# Patient Record
Sex: FEMALE
[De-identification: names, ages, dates, MRNs, and addresses within clinical notes are randomized per-mention and may not be internally consistent; named-entity substitution may affect disease eponyms.]

---

## 2020-01-20 ENCOUNTER — HOSPITAL ENCOUNTER (EMERGENCY)
Dept: HOSPITAL 43 - DL.ED | Age: 85
LOS: 1 days | Discharge: NURSING, REHAB OR LONG-TERM CARE | End: 2020-01-21
Payer: MEDICARE

## 2020-01-20 DIAGNOSIS — Z79.899: ICD-10-CM

## 2020-01-20 DIAGNOSIS — K21.9: ICD-10-CM

## 2020-01-20 DIAGNOSIS — Z79.82: ICD-10-CM
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
## 2020-01-20 DIAGNOSIS — I10: ICD-10-CM

## 2020-01-20 DIAGNOSIS — S72.142A: Primary | ICD-10-CM

## 2020-01-20 DIAGNOSIS — E78.00: ICD-10-CM

## 2020-01-20 LAB
ANION GAP SERPL CALC-SCNC: 10 MMOL/L
CHLORIDE SERPL-SCNC: 103 MMOL/L (ref 101–111)
SODIUM SERPL-SCNC: 132 MMOL/L (ref 135–145)

## 2020-01-20 PROCEDURE — 96374 THER/PROPH/DIAG INJ IV PUSH: CPT

## 2020-01-20 PROCEDURE — 80053 COMPREHEN METABOLIC PANEL: CPT

## 2020-01-20 PROCEDURE — 85610 PROTHROMBIN TIME: CPT

## 2020-01-20 PROCEDURE — 36415 COLL VENOUS BLD VENIPUNCTURE: CPT

## 2020-01-20 PROCEDURE — 96376 TX/PRO/DX INJ SAME DRUG ADON: CPT

## 2020-01-20 PROCEDURE — 85025 COMPLETE CBC W/AUTO DIFF WBC: CPT

## 2020-01-20 PROCEDURE — 99285 EMERGENCY DEPT VISIT HI MDM: CPT

## 2020-01-20 PROCEDURE — 51702 INSERT TEMP BLADDER CATH: CPT

## 2020-01-20 PROCEDURE — 73502 X-RAY EXAM HIP UNI 2-3 VIEWS: CPT

## 2020-01-20 NOTE — EDM.PDOC
ED HPI GENERAL MEDICAL PROBLEM





- General


Chief Complaint: Lower Extremity Injury/Pain


Stated Complaint: AMBULANCE


Time Seen by Provider: 01/20/20 22:55


Source of Information: Reports: Patient, EMS, EMS Notes Reviewed, RN, RN Notes 

Reviewed


History Limitations: Reports: No Limitations





- History of Present Illness


INITIAL COMMENTS - FREE TEXT/NARRATIVE: 


patient presents to ER per [facility] with complaint of left hip pain. Patient states 

she was in her bedroom putting pajama pants on when she slipped and fell 

falling on the left hip. Patient denies hitting her head or being knocked out. 

Patient states history of glaucoma, hypertension, and stents in her heart in 

2000. Left leg is shortened and left foot is externally rotated, pulses present.


Onset: Today, Sudden


  ** Left Upper Leg


Pain Score (Numeric/FACES): 10





- Related Data


 Allergies











Allergy/AdvReac Type Severity Reaction Status Date / Time


 


No Known Allergies Allergy   Verified 01/20/20 22:36











Home Meds: 


 Home Meds





Aspirin [Ecotrin EC] 325 mg PO DAILY 01/21/20 [History]


Brimonidine Tartrate [Alphagan P 0.1% Ophth Soln] 1 drop EYELF DAILY 01/21/20 [

History]


Fluorometholone [Fluorometholone 0.1% Ophth Susp] 1 drop EYELF DAILY 01/21/20 [

History]


Isosorbide Mononitrate [Imdur] 120 mg PO DAILY 01/21/20 [History]


Latanoprost [Xalatan 0.005% Ophth Soln] 1 drop EYELF BEDTIME 01/21/20 [History]


Losartan Potassium [Cozaar] 100 mg PO DAILY 01/21/20 [History]


Omega-3/DHA/Epa/Fish Oil [Omega-3 Fish Oil 1,000 MG Sfgl] 1 cap PO DAILY 01/21/ 20 [History]


Omeprazole 20 mg PO ACBREAKFAST 01/21/20 [History]


amLODIPine [Norvasc] 5 mg PO DAILY 01/21/20 [History]


atorvaSTATin [Lipitor] 20 mg PO BEDTIME 01/21/20 [History]











Past Medical History


HEENT History: Reports: Impaired Vision


Cardiovascular History: Reports: High Cholesterol, Hypertension


Gastrointestinal History: Reports: GERD





Social & Family History





- Tobacco Use


Smoking Status *Q: Never Smoker


Second Hand Smoke Exposure: No





- Caffeine Use


Caffeine Use: Reports: None





- Recreational Drug Use


Recreational Drug Use: No





Review of Systems





- Review of Systems


Review Of Systems: Comprehensive ROS is negative, except as noted in HPI.





ED EXAM, GENERAL





- Physical Exam


Exam: See Below


Exam Limited By: No Limitations


General Appearance: Alert, WD/WN, Mild Distress


Eye Exam: Bilateral Eye: EOMI, Normal Inspection


Ears: Normal External Exam, Hearing Grossly Normal


Nose: Normal Inspection


Throat/Mouth: Normal Inspection, Normal Voice, No Airway Compromise


Head: Atraumatic, Normocephalic


Neck: Normal Inspection, Supple, Non-Tender, Full Range of Motion


Respiratory/Chest: No Respiratory Distress, Lungs Clear, Normal Breath Sounds, 

No Accessory Muscle Use, Chest Non-Tender


Cardiovascular: Normal Peripheral Pulses, Regular Rate, Rhythm, No Edema, No 

Gallop, No JVD, No Murmur, No Rub


Peripheral Pulses: 2+: Radial (L), Radial (R), Dorsalis Pedis (L), Dorsalis 

Pedis (R)


GI/Abdominal: Normal Bowel Sounds, Soft, Non-Tender


 (Female) Exam: Deferred


Rectal (Female) Exam: Deferred


Back Exam: Normal Inspection, Full Range of Motion, NT


Extremities: Leg Pain (left), Limited Range of Motion (left leg shortened and 

externally rotated)


Neurological: Alert, Oriented, CN II-XII Intact, Normal Cognition, Normal Gait, 

Normal Reflexes, No Motor/Sensory Deficits


Psychiatric: Normal Affect, Normal Mood


Skin Exam: Warm, Dry, Intact, Normal Color, No Rash


Lymphatic: No Adenopathy





Course





- Vital Signs


Last Recorded V/S: 


 Last Vital Signs











Temp  98.8 F   01/20/20 22:37


 


Pulse  48 L  01/20/20 22:37


 


Resp  16   01/20/20 22:37


 


BP  165/53 H  01/20/20 22:37


 


Pulse Ox  99   01/20/20 22:37














- Orders/Labs/Meds


Orders: 


 Active Orders 24 hr











 Category Date Time Status


 


 Insert Miller Catheter [Insert Urinary Catheter] [OM.PC] Care  01/21/20 01:00 

Ordered





 Q24H   


 


 Urinary Catheter Assessment [RC] ASDIRECTED Care  01/21/20 00:48 Active


 


 Femur Min 2V Lt [CR] Stat Exams  01/20/20 23:16 Ordered


 


 Hip Min 2V or 3V Lt [CR] Stat Exams  01/20/20 23:16 Taken











Labs: 


 Laboratory Tests











  01/20/20 01/20/20 01/20/20 Range/Units





  22:45 22:45 23:30 


 


WBC  6.5    (5.0-10.0)  10^3/uL


 


RBC  3.86 L    (4.2-5.4)  10^6/uL


 


Hgb  11.7 L    (12.0-16.0)  g/dL


 


Hct  34.2 L    (37.0-47.0)  %


 


MCV  88.6    ()  fL


 


MCH  30.3    (27.0-34.0)  pg


 


MCHC  34.2    (33.0-35.0)  g/dL


 


Plt Count  157    (150-450)  10^3/uL


 


Neut % (Auto)  71.4    (42.2-75.2)  %


 


Lymph % (Auto)  15.6 L    (20.5-50.1)  %


 


Mono % (Auto)  10.9 H    (2-8)  %


 


Eos % (Auto)  1.8    (1.0-3.0)  %


 


Baso % (Auto)  0.3    (0.0-1.0)  %


 


PT    9.6  (9.0-12.0)  SEC


 


INR    0.9  (0.9-1.2)  


 


Sodium   132 L   (135-145)  mmol/L


 


Potassium   4.0   (3.6-5.0)  mmol/L


 


Chloride   103   (101-111)  mmol/L


 


Carbon Dioxide   23.0   (21.0-31.0)  mmol/L


 


Anion Gap   10.0   


 


BUN   20 H   (7-18)  mg/dL


 


Creatinine   1.0   (0.6-1.3)  mg/dL


 


Est Cr Clr Drug Dosing   30.62   mL/min


 


Estimated GFR (MDRD)   53   


 


BUN/Creatinine Ratio   20.00   


 


Glucose   123 H   ()  mg/dL


 


Calcium   9.4   (8.4-10.2)  mg/dl


 


Total Bilirubin   0.6   (0.2-1.0)  mg/dL


 


AST   23   (10-42)  IU/L


 


ALT   16   (10-60)  IU/L


 


Alkaline Phosphatase   56   ()  IU/L


 


Total Protein   7.2   (6.7-8.2)  g/dl


 


Albumin   4.3   (3.2-5.5)  g/dl


 


Globulin   2.9   


 


Albumin/Globulin Ratio   1.48   











Meds: 


Medications














Discontinued Medications














Generic Name Dose Route Start Last Admin





  Trade Name Kei  PRN Reason Stop Dose Admin


 


Fentanyl  50 mcg  01/21/20 00:37  01/21/20 00:43





  Sublimaze  IVPUSH  01/21/20 00:38  50 mcg





  ONETIME ONE   Administration





     





     





     





     


 


Fentanyl  50 mcg  01/21/20 01:31  01/21/20 01:33





  Sublimaze  IVPUSH  01/21/20 01:32  50 mcg





  ONETIME ONE   Administration





     





     





     





     














- Radiology Interpretation


Free Text/Narrative:: 


Left hip/pelvis xray:


FINDINGS:


Bones/joints: The bones are demineralized which reduces radiographic 

sensitivity. There is an acute


intertrochanteric fracture of the proximal left femur. The distal fracture 

fragment is anteriorly displaced


by less than half the width of the shaft. The shaft of the femur is superiorly 

retracted such that the


greater trochanter lies at the level of the acetabular roof. The left femoral 

head maintains its normal


rounded shape and is appropriately seated within its acetabulum. The left pubic 

rami are intact. The


pubic symphysis is not widened.


Soft tissues: Unremarkable.


Vasculature: Moderate atherosclerotic calcifications are noted in the 

distribution of the left common


femoral and superficial femoral arteries.


IMPRESSION:


Acute intertrochanteric fracture of the left hip.


Thank you for allowing us to participate in the care of your patient.


Dictated and Authenticated by: Gladys Peters MD


01/21/2020 12:30 AM Central Time ([place] & [place])








See rad report








- Re-Assessments/Exams


Free Text/Narrative Re-Assessment/Exam: 





01/21/20 01:20


Discussed patient case with Dr. Thornton who agreed to accept the patient for 

transfer to [facility]. 








Departure





- Departure


Time of Disposition: 01:20


Disposition: DC/Tfer to Acute Hospital 02


Condition: Fair


Clinical Impression: 


 Intertrochanteric fracture, hip








- Discharge Information


*PRESCRIPTION DRUG MONITORING PROGRAM REVIEWED*: No


*COPY OF PRESCRIPTION DRUG MONITORING REPORT IN PATIENT VICKIE: No


Referrals: 


PCP,None [Primary Care Provider] - 


Forms[facility]  [facility]





Sepsis Event Note





- Evaluation


Sepsis Screening Result: No Definite Risk





- Focused Exam


Vital Signs: 


 Vital Signs











  Temp Pulse Resp BP Pulse Ox


 


 01/20/20 22:37  98.8 F  48 L  16  165/53 H  99











Date Exam was Performed: 01/21/20


Time Exam was Performed: 03:07





- My Orders


Last 24 Hours: 


My Active Orders





01/20/20 23:16


Femur Min 2V Lt [CR] Stat 


Hip Min 2V or 3V Lt [CR] Stat 





01/21/20 00:48


Urinary Catheter Assessment [RC] ASDIRECTED 





01/21/20 01:00


Insert Miller Catheter [Insert Urinary Catheter] [OM.PC] Q24H 














- Assessment/Plan


Last 24 Hours: 


My Active Orders





01/20/20 23:16


Femur Min 2V Lt [CR] Stat 


Hip Min 2V or 3V Lt [CR] Stat 





01/21/20 00:48


Urinary Catheter Assessment [RC] ASDIRECTED 





01/21/20 01:00


Insert Miller Catheter [Insert Urinary Catheter] [OM.PC] Q24H

## 2020-01-24 ENCOUNTER — HOSPITAL ENCOUNTER (INPATIENT)
Dept: HOSPITAL 43 - DL.MS | Age: 85
LOS: 6 days | DRG: 560 | End: 2020-01-30
Attending: INTERNAL MEDICINE | Admitting: HOSPITALIST
Payer: MEDICARE

## 2020-01-24 DIAGNOSIS — Z95.5: ICD-10-CM

## 2020-01-24 DIAGNOSIS — I35.1: ICD-10-CM

## 2020-01-24 DIAGNOSIS — Z79.899: ICD-10-CM

## 2020-01-24 DIAGNOSIS — R53.1: ICD-10-CM

## 2020-01-24 DIAGNOSIS — E78.5: ICD-10-CM

## 2020-01-24 DIAGNOSIS — Z95.0: ICD-10-CM
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
## 2020-01-24 DIAGNOSIS — I10: ICD-10-CM

## 2020-01-24 DIAGNOSIS — K59.09: ICD-10-CM

## 2020-01-24 DIAGNOSIS — Z96.651: ICD-10-CM

## 2020-01-24 DIAGNOSIS — D62: ICD-10-CM
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
## 2020-01-24 DIAGNOSIS — Z79.82: ICD-10-CM

## 2020-01-24 DIAGNOSIS — H92.01: ICD-10-CM
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
## 2020-01-24 DIAGNOSIS — H54.7: ICD-10-CM

## 2020-01-24 DIAGNOSIS — S02.5XXA: ICD-10-CM

## 2020-01-24 DIAGNOSIS — M19.90: ICD-10-CM

## 2020-01-24 DIAGNOSIS — K21.9: ICD-10-CM

## 2020-01-24 DIAGNOSIS — Z86.79: ICD-10-CM

## 2020-01-24 DIAGNOSIS — E78.00: ICD-10-CM

## 2020-01-24 DIAGNOSIS — F41.9: ICD-10-CM

## 2020-01-24 DIAGNOSIS — I46.9: ICD-10-CM

## 2020-01-24 DIAGNOSIS — I25.10: ICD-10-CM

## 2020-01-24 DIAGNOSIS — I49.01: ICD-10-CM

## 2020-01-24 DIAGNOSIS — Z28.82: ICD-10-CM

## 2020-01-24 DIAGNOSIS — Z98.42: ICD-10-CM

## 2020-01-24 DIAGNOSIS — S72.002D: Primary | ICD-10-CM

## 2020-01-24 DIAGNOSIS — H40.9: ICD-10-CM

## 2020-01-24 RX ADMIN — Medication SCH DROP: at 21:06

## 2020-01-24 NOTE — PCM.HP
H&P History of Present Illness





- General


Date of Service: 01/24/20


Admit Problem/Dx: 


 Admission Diagnosis/Problem





Admission Diagnosis/Problem      Weakness











- History of Present Illness


Initial Comments - Free Text/Narative: 





History of Present Illness:


Brittany Garcia a84 y.o.female with a medical history of CAD status post PCI, a 

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 

hyperlipidemia, hypertension, glaucoma, who was admitted following a fall with 

left hip fracture. Patient is status post gamma nailing.


Transferred to swing bed for physical and occupational therapy 








- Related Data


Allergies/Adverse Reactions: 


 Allergies











Allergy/AdvReac Type Severity Reaction Status Date / Time


 


No Known Allergies Allergy   Verified 01/24/20 09:04











Home Medications: 


 Home Meds





Aspirin [Ecotrin EC] 325 mg PO DAILY 01/21/20 [History]


Brimonidine Tartrate [Alphagan P 0.1% Ophth Soln] 1 drop EYELF BID 01/21/20 [

History]


Fluorometholone [Fluorometholone 0.1% Ophth Susp] 1 drop EYELF DAILY 01/21/20 [

History]


Isosorbide Mononitrate [Imdur] 120 mg PO DAILY 01/21/20 [History]


Latanoprost [Xalatan 0.005% Ophth Soln] 1 drop EYELF BEDTIME 01/21/20 [History]


Losartan Potassium [Cozaar] 100 mg PO DAILY 01/21/20 [History]


Omega-3/DHA/Epa/Fish Oil [Omega-3 Fish Oil 1,000 MG Sfgl] 1 cap PO DAILY 01/21/ 20 [History]


Omeprazole 20 mg PO ACBREAKFAST 01/21/20 [History]


amLODIPine [Norvasc] 5 mg PO DAILY 01/21/20 [History]


atorvaSTATin [Lipitor] 20 mg PO BEDTIME 01/21/20 [History]


Acetaminophen 1,000 mg PO Q8HR 01/24/20 [History]


LORazepam [Ativan] 0.5 mg PO TID PRN 01/24/20 [History]


Nitroglycerin [Nitrostat] 0.4 mg PO ASDIRECTED 01/24/20 [History]


Timolol Maleate [Timoptic 0.5% Ophth Soln] 1 drop EYEBOTH DAILY 01/24/20 [

History]











Past Medical History


HEENT History: Reports: Cataract, Glaucoma, Impaired Vision


Cardiovascular History: Reports: High Cholesterol, Hypertension, Pacemaker, 

Stents


Gastrointestinal History: Reports: Chronic Constipation, GERD, Hemorrhoids


OB/GYN History: Reports: Pregnancy


Musculoskeletal History: Reports: Arthritis


Psychiatric History: Reports: Anxiety





- Past Surgical History


HEENT Surgical History: Reports: Cataract Surgery


Other HEENT Surgeries/Procedures: Cataract Surgery in left eye and Glaucoma in 

left eye


Cardiovascular Surgical History: Reports: Pacer


GI Surgical History: Reports: None


Musculoskeletal Surgical History: Reports: Knee Replacement, Other (See Below)


Other Musculoskeletal Surgeries/Procedures:: Left femur fracture.  Right knee 

replacement





Social & Family History





- Family History


Family Medical History: Noncontributory





- Tobacco Use


Smoking Status *Q: Never Smoker


Second Hand Smoke Exposure: No





- Caffeine Use


Caffeine Use: Reports: Soda


Caffeine Use Comment: de-caff coffee





- Recreational Drug Use


Recreational Drug Use: No





H&P Review of Systems





- Review of Systems:


Review Of Systems: See Below


General: Reports: Weakness


HEENT: Reports: No Symptoms


Pulmonary: Reports: No Symptoms


Cardiovascular: Reports: No Symptoms


Gastrointestinal: Reports: No Symptoms


Genitourinary: Reports: No Symptoms


Musculoskeletal: Reports: Leg Pain


Skin: Reports: No Symptoms





Exam





- Exam


Exam: See Below





- Vital Signs


Weight: 58.786 kg





- Exam


General: Alert, Oriented, Cooperative


HEENT: PERRLA, Hearing Intact, Mucosa Moist & Pink, Nares Patent, Normal Nasal 

Septum, Posterior Pharynx Clear, Conjunctiva Clear, EOMI, EACs Clear, TMs Clear


Neck: Supple, Trachea Midline, 2


Lungs: Clear to Auscultation, Normal Respiratory Effort


Cardiovascular: Regular Rate, Regular Rhythm


GI/Abdominal Exam: Normal Bowel Sounds, Soft, Non-Tender, No Organomegaly, No 

Distention, No Abnormal Bruit, No Mass, Pelvis Stable


Extremities: No Pedal Edema


Skin: Warm, Dry, Intact


Problem List Initiated/Reviewed/Updated: Yes


Orders Last 24hrs: 


 Active Orders 24 hr











 Category Date Time Status


 


 Patient Status [ADT] Routine ADT  01/24/20 12:55 Active


 


 Height and Weight [RC] PER UNIT ROUTINE Care  01/24/20 12:58 Active


 


 Intake and Output [RC] ASDIRECTED Care  01/24/20 12:55 Active


 


 Oxygen Therapy [RC] PRN Care  01/24/20 12:55 Active


 


 Up ad Marlyn [RC] ASDIRECTED Care  01/24/20 12:55 Active


 


 VTE/DVT Education [RC] PER UNIT ROUTINE Care  01/24/20 12:55 Active


 


 Vital Signs [RC] PER UNIT ROUTINE Care  01/24/20 12:55 Active


 


 OT Evaluation and Treatment [CONS] Routine Cons  01/24/20 12:55 Active


 


 PT Evaluation and Treatment [CONS] Routine Cons  01/24/20 12:55 Active


 


 Regular Diet [DIET] Diet  01/24/20 Lunch Active


 


 Acetaminophen [Tylenol Extra Strength] Med  01/24/20 14:00 Active





 1,000 mg PO Q8HR   


 


 Acetaminophen [Tylenol] Med  01/24/20 12:55 Active





 650 mg PO Q4H PRN   


 


 Aspirin [Ecotrin] Med  01/25/20 09:00 Active





 325 mg PO DAILY   


 


 Brimonidine Tartrate [Alphagan P 0.1% Ophth Soln] Med  01/24/20 21:00 Active





 1 drop EYELF BID   


 


 Docusate Sodium [Colace] Med  01/24/20 12:55 Ordered





 100 mg PO BID PRN   


 


 Enoxaparin [Lovenox] Med  01/25/20 09:00 Active





 30 mg SUBCUT DAILY   


 


 Fluorometholone Med  01/25/20 09:00 Active





 1 drop EYELF DAILY   


 


 Isosorbide Mononitrate [Imdur] Med  01/25/20 06:00 Active





 120 mg PO ACBRK   


 


 LORazepam [Ativan] Med  01/24/20 13:10 Active





 0.5 mg PO TID PRN   


 


 Latanoprost [Xalatan 0.005% Ophth Soln] Med  01/24/20 21:00 Active





 0 ml EYELF BEDTIME   


 


 Losartan [Cozaar] Med  01/25/20 09:00 Active





 100 mg PO DAILY   


 


 Omega-3/DHA/Epa/Fish Oil [Omega-3 Fish Oil 1,000 MG Med  01/25/20 09:00 Ordered





 Sfgl]   





 1 cap PO DAILY   


 


 Omeprazole Med  01/25/20 11:00 Active





 20 mg PO ACLUNCH   


 


 Ondansetron [Zofran ODT] Med  01/24/20 12:55 Ordered





 4 mg PO Q6H PRN   


 


 Timolol Maleate [Timoptic 0.5% Ophth Soln] Med  01/25/20 09:00 Ordered





 DOSE ml EYEBOTH DAILY   


 


 amLODIPine [Norvasc] Med  01/24/20 13:45 Active





 5 mg PO DAILY   


 


 atorvaSTATin [Lipitor] Med  01/24/20 21:00 Active





 20 mg PO BEDTIME   


 


 Resuscitation Status Routine Resus Stat  01/24/20 12:55 Ordered








 Medication Orders





Acetaminophen (Tylenol)  650 mg PO Q4H PRN


   PRN Reason: Pain (Mild 1-3)/fever


Acetaminophen (Tylenol Extra Strength)  1,000 mg PO Q8HR [facility]


Amlodipine Besylate (Norvasc)  5 mg PO DAILY [facility]


Aspirin (Ecotrin)  325 mg PO DAILY [facility]


Atorvastatin Calcium (Lipitor)  20 mg PO BEDTIME [facility]


Docusate Sodium (Colace)  100 mg PO BID PRN


   PRN Reason: Constipation


Enoxaparin Sodium (Lovenox)  30 mg SUBCUT DAILY [facility]


Isosorbide Mononitrate (Imdur)  120 mg PO ACBRK [facility]


Latanoprost (Xalatan 0.005% Ophth Soln)  0 ml EYELF BEDTIME [facility]


Lorazepam (Ativan)  0.5 mg PO TID PRN


   PRN Reason: Anxiety


Losartan Potassium (Cozaar)  100 mg PO DAILY [facility]


Brimonidine Tartrate [Alphagan P 0.1% Ophth Soln]  **Pt Own Med**  1 drop EYELF 

BID [facility]


Fluorometholone 0.1% (Opht Susp)  1 drop EYELF DAILY [facility]


Non-Formulary Medication (Omega-3/Dha/Epa/Fish Oil [Omega-3 Fish Oil 1,000 Mg 

Sfgl])  1 cap PO DAILY [facility]


Omeprazole (Omeprazole)  20 mg PO ACLUNCH [facility]


Ondansetron HCl (Zofran Odt)  4 mg PO Q6H PRN


   PRN Reason: nausea, able to take PO


Timolol Maleate (Timoptic 0.5% Ophth Soln)   ml EYEBOTH DAILY [facility]








Assessment/Plan Comment:: 





Status post left hip fracture


s/p surgery 


PT/OT 





Acute anemia


Secondary to blood loss


Trend HB 


\


Bradycardia


H/o complete heart block post pacemaker


Pacemaker was interrogated. It appears the setting is 30.


No acute issues at this time


Continue current care





Coronary artery disease


Continue home medications.





Hyperlipidemia/hypertension


Continue home medications.





History of nonobstructive carotid disease


No acute intervention at this time





History of mild aortic insufficiency


Asymptomatic

## 2020-01-25 RX ADMIN — TIMOLOL MALEATE SCH DROP: 5 SOLUTION OPHTHALMIC at 21:36

## 2020-01-25 RX ADMIN — ISOSORBIDE MONONITRATE SCH MG: 60 TABLET, FILM COATED, EXTENDED RELEASE ORAL at 05:20

## 2020-01-25 RX ADMIN — Medication SCH: at 21:43

## 2020-01-25 RX ADMIN — Medication SCH DROP: at 09:40

## 2020-01-25 RX ADMIN — ASPIRIN SCH MG: 325 TABLET, DELAYED RELEASE ORAL at 09:34

## 2020-01-25 RX ADMIN — Medication SCH DROP: at 09:37

## 2020-01-26 RX ADMIN — ISOSORBIDE MONONITRATE SCH MG: 60 TABLET, FILM COATED, EXTENDED RELEASE ORAL at 05:54

## 2020-01-26 RX ADMIN — TIMOLOL MALEATE SCH DROP: 5 SOLUTION OPHTHALMIC at 21:34

## 2020-01-26 RX ADMIN — OMEPRAZOLE SCH MG: 20 CAPSULE, DELAYED RELEASE ORAL at 05:55

## 2020-01-26 RX ADMIN — Medication SCH DROP: at 16:21

## 2020-01-26 RX ADMIN — ASPIRIN SCH MG: 325 TABLET, DELAYED RELEASE ORAL at 08:56

## 2020-01-26 RX ADMIN — Medication SCH DROP: at 09:09

## 2020-01-26 RX ADMIN — Medication SCH DROP: at 08:56

## 2020-01-26 RX ADMIN — Medication SCH DROP: at 09:58

## 2020-01-27 RX ADMIN — Medication SCH DROP: at 09:54

## 2020-01-27 RX ADMIN — OMEPRAZOLE SCH MG: 20 CAPSULE, DELAYED RELEASE ORAL at 06:06

## 2020-01-27 RX ADMIN — ISOSORBIDE MONONITRATE SCH MG: 60 TABLET, FILM COATED, EXTENDED RELEASE ORAL at 06:06

## 2020-01-27 RX ADMIN — Medication SCH DROP: at 09:57

## 2020-01-27 RX ADMIN — TIMOLOL MALEATE SCH DROP: 5 SOLUTION OPHTHALMIC at 21:24

## 2020-01-27 RX ADMIN — Medication SCH DROP: at 16:58

## 2020-01-27 RX ADMIN — ASPIRIN SCH MG: 325 TABLET, DELAYED RELEASE ORAL at 09:55

## 2020-01-27 NOTE — PCM.PN
- General Info


Date of Service: 01/27/20


Subjective Update: 


Patient is complaining of right earache


This has been going on for couple of days


Has worsened over time


Associated generalized body malaise.


Temperature today is 99.5. No nausea and no vomiting. Has associated runny nose








- Patient Data


Vitals - Most Recent: 


 Last Vital Signs











Temp  37.5 C   01/27/20 08:00


 


Pulse  88   01/27/20 08:00


 


Resp  18   01/27/20 08:00


 


BP  123/69   01/27/20 09:56


 


Pulse Ox  98   01/27/20 08:00











Weight - Most Recent: 58.145 kg


I&O - Last 24 Hours: 


 Intake & Output











 01/26/20 01/27/20 01/27/20





 22:59 06:59 14:59


 


Intake Total 1300 250 


 


Balance 1300 250 











Med Orders - Current: 


 Current Medications





Acetaminophen (Tylenol)  650 mg PO Q4H PRN


   PRN Reason: Pain (Mild 1-3)/fever


Acetaminophen (Tylenol Extra Strength)  1,000 mg PO Q8HR [facility]


   Last Admin: 01/27/20 06:06 Dose:  1,000 mg


Amlodipine Besylate (Norvasc)  5 mg PO DAILY [facility]


   Last Admin: 01/27/20 09:56 Dose:  5 mg


Aspirin (Ecotrin)  325 mg PO DAILY [facility]


   Last Admin: 01/27/20 09:55 Dose:  325 mg


Atorvastatin Calcium (Lipitor)  20 mg PO BEDTIME [facility]


   Last Admin: 01/26/20 21:31 Dose:  20 mg


Carbamide Perox/Anhydrous Glycerin (Debrox 6.5% Otic Soln)  1 ml EARRT BID [facility]


Docusate Sodium (Colace)  100 mg PO BID PRN


   PRN Reason: Constipation


   Last Admin: 01/26/20 21:31 Dose:  100 mg


Enoxaparin Sodium (Lovenox)  30 mg SUBCUT DAILY [facility]


   Last Admin: 01/27/20 09:57 Dose:  30 mg


Isosorbide Mononitrate (Imdur)  120 mg PO ACBRK [facility]


   Last Admin: 01/27/20 06:06 Dose:  120 mg


Latanoprost (Xalatan 0.005% Ophth Soln)  0 ml EYELF BEDTIME [facility]


   Last Admin: 01/26/20 21:37 Dose:  1 drop


Loratadine (Claritin)  10 mg PO DAILY [facility]


Lorazepam (Ativan)  0.5 mg PO TID PRN


   PRN Reason: Anxiety


Losartan Potassium (Cozaar)  100 mg PO DAILY [facility]


   Last Admin: 01/27/20 09:55 Dose:  100 mg


Fluorometholone 0.1% (Opht Susp)  1 drop EYELF DAILY [facility]


   Last Admin: 01/27/20 09:57 Dose:  1 drop


Brimonidine Tartrate [Alphagan P 0.1% Ophth Soln]  **Pt Own Med**  1 drop EYELF 

0900,1600 [facility]


   Last Admin: 01/27/20 09:54 Dose:  1 drop


Omeprazole (Omeprazole)  20 mg PO ACBREAKFAST [facility]


   Last Admin: 01/27/20 06:06 Dose:  20 mg


Ondansetron HCl (Zofran Odt)  4 mg PO Q6H PRN


   PRN Reason: nausea, able to take PO


Timolol Maleate (Timoptic 0.5% Ophth Soln)  0 ml EYEBOTH BEDTIME [facility]





Discontinued Medications





Latanoprost (Xalatan 0.005% Ophth Soln)  0 ml EYELF BEDTIME [facility]


   Last Admin: 01/25/20 21:37 Dose:  Not Given


Latanoprost (Xalatan 0.005% Ophth Soln)  0 ml EYELF BEDTIME [facility]


Brimonidine Tartrate [Alphagan P 0.1% Ophth Soln]  **Pt Own Med**  1 drop EYELF 

BID [facility]


   Last Admin: 01/26/20 09:09 Dose:  1 drop


Non-Formulary Medication (Omega-3/Dha/Epa/Fish Oil [Omega-3 Fish Oil 1,000 Mg 

Sfgl])  1 cap PO DAILY [facility]


Omeprazole (Omeprazole)  20 mg PO ACLUNCH [facility]


   Last Admin: 01/25/20 12:20 Dose:  20 mg


Timolol Maleate (Timoptic 0.5% Ophth Soln)  0 ml EYEBOTH DAILY [facility]


   Last Admin: 01/25/20 12:22 Dose:  Not Given


Timolol Maleate (Timoptic 0.5% Ophth Soln)  0 ml EYEBOTH BEDTIME [facility]


   Last Admin: 01/26/20 21:34 Dose:  1 drop











- Exam


General: Alert, Oriented, Cooperative


HEENT: Pupils Equal, Pupils Reactive, Mucous Membr. Moist/Pink, Other (Right 

ear examined. Eardrum appears within normal limits. Has a little bit of wax in 

the right ear)


Lungs: Clear to Auscultation, Normal Respiratory Effort





Sepsis Event Note





- Evaluation


Sepsis Screening Result: No Definite Risk





- Focused Exam


Vital Signs: 


 Vital Signs











  Temp Pulse Resp BP BP Pulse Ox


 


 01/27/20 09:56     123/69  


 


 01/27/20 09:55     123/69  


 


 01/27/20 08:00  37.5 C  88  18   123/69  98











Date Exam was Performed: 01/27/20


Time Exam was Performed: 10:05





- Problem List Review


Problem List Initiated/Reviewed/Updated: Yes





- My Orders


Last 24 Hours: 


My Active Orders





01/27/20 10:15


Carbamide Peroxide [Debrox 6.5% Otic Soln]   1 ml EARRT BID 


Loratadine [Claritin]   10 mg PO DAILY 





01/27/20 21:00


Timolol Maleate [Timoptic 0.5% Ophth Soln]   0 ml EYEBOTH BEDTIME 














- Plan


Plan:: 





Status post left hip fracture


s/p surgery 


PT/OT 





Acute anemia


Secondary to blood loss


Trend HB 





#. Right ear ache


Reason for this is not obvious.


Patient has associated runny nose.


Start patient on loratadine 10 mg daily


Applied Debrox ear drops for wax noted in the right ear








Bradycardia


H/o complete heart block post pacemaker


Pacemaker was interrogated. It appears the setting is 30.


No acute issues at this time


Continue current care





Coronary artery disease


Continue home medications.





Hyperlipidemia/hypertension


Continue home medications.





History of nonobstructive carotid disease


No acute intervention at this time





History of mild aortic insufficiency


Asymptomatic

## 2020-01-28 RX ADMIN — ASPIRIN SCH MG: 325 TABLET, DELAYED RELEASE ORAL at 09:06

## 2020-01-28 RX ADMIN — ISOSORBIDE MONONITRATE SCH MG: 60 TABLET, FILM COATED, EXTENDED RELEASE ORAL at 05:36

## 2020-01-28 RX ADMIN — Medication SCH DROP: at 15:24

## 2020-01-28 RX ADMIN — TIMOLOL MALEATE SCH DROP: 5 SOLUTION OPHTHALMIC at 22:18

## 2020-01-28 RX ADMIN — Medication SCH DROP: at 09:10

## 2020-01-28 RX ADMIN — OMEPRAZOLE SCH MG: 20 CAPSULE, DELAYED RELEASE ORAL at 05:36

## 2020-01-28 RX ADMIN — Medication SCH DROP: at 09:12

## 2020-01-29 RX ADMIN — ISOSORBIDE MONONITRATE SCH MG: 60 TABLET, FILM COATED, EXTENDED RELEASE ORAL at 05:42

## 2020-01-29 RX ADMIN — OMEPRAZOLE SCH MG: 20 CAPSULE, DELAYED RELEASE ORAL at 05:42

## 2020-01-29 RX ADMIN — TIMOLOL MALEATE SCH DROP: 5 SOLUTION OPHTHALMIC at 20:58

## 2020-01-29 RX ADMIN — ASPIRIN SCH MG: 325 TABLET, DELAYED RELEASE ORAL at 08:31

## 2020-01-29 RX ADMIN — Medication SCH DROP: at 08:35

## 2020-01-29 RX ADMIN — Medication SCH DROP: at 17:04

## 2020-01-30 PROCEDURE — 5A12012 PERFORMANCE OF CARDIAC OUTPUT, SINGLE, MANUAL: ICD-10-PCS | Performed by: INTERNAL MEDICINE

## 2020-01-30 NOTE — [id number]
**Discharge Summary





- Hospital Course


Free Text/Narrative:: 





Brittany Garcia a84 y.o.female with a medical history of CAD status post PCI, a 

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 

hyperlipidemia, hypertension, glaucoma, who was hospitalized following a fall 

with left hip fracture. She underwent gamma nailing.


She was transferred to [facility] bed for physical and occupational therapy. 





At night on 2020, patient had a fall when she went by herself to use the 

bathroom. She hit her face and broke her front teeth. She did not loose 

consciousness and denied any pain. About an hour later, she became pale and 

unresponsive. CPR was started immediately. She was found to be in Vfib arrest 

and received 1 shock. She then went into PEA arrest and did no achieve ROSC 

despite 4 doses of epinephrine and 1 dose of bicarb. Family decided to stop 

resuscitation efforts. Patient was pronounced dead 12:20am 2020.





Discharge diagnosis


VFib/PEA arrest


Left hip fracture s/p surgery 


Acute blood loss anemia


Right ear ache


Bradycardia


H/o Heart block s/p PPM


CAD


Hyperlipidemia


Mild aortic insufficiency 








- Discharge Data


Discharge Date: 20


Discharge Disposition:  20


Preliminary Cause of Death *Q: Cardiac Arrest


Condition: 





- Referral to Home Health


Primary Care Physician: 


PCP Not In Area








- Patient Summary/Data


Consults: 


 Consultations





20 12:55


OT Evaluation and Treatment [CONS] Routine 


PT Evaluation and Treatment [CONS] Routine 














- Discharge Plan


*PRESCRIPTION DRUG MONITORING PROGRAM REVIEWED*: Not Applicable


*COPY OF PRESCRIPTION DRUG MONITORING REPORT IN PATIENT VICKIE: Not Applicable


Home Medications: 


 Home Meds





Aspirin [Ecotrin EC] 325 mg PO DAILY 20 [History]


Brimonidine Tartrate [Alphagan P 0.1% Ophth Soln] 1 drop EYELF BID 20 [

History]


Fluorometholone [Fluorometholone 0.1% Ophth Susp] 1 drop EYELF DAILY 20 [

History]


Isosorbide Mononitrate [Imdur] 120 mg PO DAILY 20 [History]


Latanoprost [Xalatan 0.005% Ophth Soln] 1 drop EYELF BEDTIME 20 [History]


Losartan Potassium [Cozaar] 100 mg PO DAILY 20 [History]


Omega-3/DHA/Epa/Fish Oil [Omega-3 Fish Oil 1,000 MG Sfgl] 1 cap PO DAILY  [History]


Omeprazole 20 mg PO ACBREAKFAST 20 [History]


amLODIPine [Norvasc] 5 mg PO DAILY 20 [History]


atorvaSTATin [Lipitor] 20 mg PO BEDTIME 20 [History]


Acetaminophen 1,000 mg PO Q8HR 20 [History]


LORazepam [Ativan] 0.5 mg PO TID PRN 20 [History]


Nitroglycerin [Nitrostat] 0.4 mg PO ASDIRECTED 20 [History]


Timolol Maleate [Timoptic 0.5% Ophth Soln] 1 drop EYEBOTH DAILY 20 [

History]











- Discharge Summary/Plan Comment


DC Time >30 min.: Yes (Time spent during ressuscitation attempt and talking to 

family)





- General Info


Date of Service: 20





- Patient Data


Vitals - Most Recent: 


 Last Vital Signs











Temp  97.9 F   20 20:00


 


Pulse  115 H  20 20:00


 


Resp  16   20 20:00


 


BP  104/69   20 20:00


 


Pulse Ox  94 L  20 20:00











Weight - Most Recent: 127 lb 4 oz


I&O - Last 24 hours: 


 Intake & Output











 20





 14:59 22:59 06:59


 


Intake Total 40 510 


 


Balance 40 510 











Med Orders - Current: 


 Current Medications





Acetaminophen (Tylenol)  650 mg PO Q4H PRN


   PRN Reason: Pain (Mild 1-3)/fever


   Last Admin: 20 11:22 Dose:  650 mg


Acetaminophen (Tylenol Extra Strength)  1,000 mg PO Q8HR [facility]


   Last Admin: 20 21:04 Dose:  1,000 mg


Amlodipine Besylate (Norvasc)  5 mg PO DAILY [facility]


   Last Admin: 20 08:31 Dose:  5 mg


Aspirin (Ecotrin)  325 mg PO DAILY [facility]


   Last Admin: 20 08:31 Dose:  325 mg


Atorvastatin Calcium (Lipitor)  20 mg PO BEDTIME [facility]


   Last Admin: 20 20:56 Dose:  20 mg


Bisacodyl (Dulcolax)  10 mg RECTAL DAILY PRN


   PRN Reason: Constipation


   Last Admin: 20 12:24 Dose:  10 mg


Carbamide Perox/Anhydrous Glycerin (Debrox 6.5% Otic Soln)  1 ml EARRT BID [facility]


   Last Admin: 20 20:56 Dose:  1 ml


Docusate Sodium (Colace)  100 mg PO BID PRN


   PRN Reason: Constipation


   Last Admin: 20 21:31 Dose:  100 mg


Enoxaparin Sodium (Lovenox)  30 mg SUBCUT DAILY [facility]


   Last Admin: 20 08:31 Dose:  30 mg


Isosorbide Mononitrate (Imdur)  120 mg PO ACBRK [facility]


   Last Admin: 20 05:42 Dose:  120 mg


Latanoprost (Xalatan 0.005% Ophth Soln)  0 ml EYELF BEDTIME [facility]


   Last Admin: 20 21:02 Dose:  1 drop


Loratadine (Claritin)  10 mg PO DAILY [facility]


   Last Admin: 20 08:31 Dose:  10 mg


Lorazepam (Ativan)  0.5 mg PO TID PRN


   PRN Reason: Anxiety


   Last Admin: 20 23:47 Dose:  0.5 mg


Losartan Potassium (Cozaar)  100 mg PO DAILY [facility]


   Last Admin: 20 08:31 Dose:  100 mg


Fluorometholone 0.1% (Opht Susp)  1 drop EYELF DAILY [facility]


   Last Admin: 20 08:35 Dose:  1 drop


Brimonidine Tartrate [Alphagan P 0.1% Ophth Soln]  **Pt Own Med**  1 drop EYELF 

0900,1600 [facility]


   Last Admin: 20 17:04 Dose:  1 drop


Omeprazole (Omeprazole)  20 mg PO ACBREAKFAST [facility]


   Last Admin: 20 05:42 Dose:  20 mg


Ondansetron HCl (Zofran Odt)  4 mg PO Q6H PRN


   PRN Reason: nausea, able to take PO


Oxycodone HCl (Oxycodone)  5 mg PO Q6H PRN


   PRN Reason: Pain


   Last Admin: 20 23:47 Dose:  5 mg


Timolol Maleate (Timoptic 0.5% Ophth Soln)  0 ml EYEBOTH BEDTIME [facility]


   Last Admin: 20 20:58 Dose:  1 drop





Discontinued Medications





Latanoprost (Xalatan 0.005% Ophth Soln)  0 ml EYELF BEDTIME [facility]


   Last Admin: 20 21:37 Dose:  Not Given


Latanoprost (Xalatan 0.005% Ophth Soln)  0 ml EYELF BEDTIME [facility]


Brimonidine Tartrate [Alphagan P 0.1% Ophth Soln]  **Pt Own Med**  1 drop EYELF 

BID [facility]


   Last Admin: 20 09:09 Dose:  1 drop


Non-Formulary Medication (Omega-3/Dha/Epa/Fish Oil [Omega-3 Fish Oil 1,000 Mg 

Sfgl])  1 cap PO DAILY [facility]


Omeprazole (Omeprazole)  20 mg PO ACLUNCH [facility]


   Last Admin: 20 12:20 Dose:  20 mg


Timolol Maleate (Timoptic 0.5% Ophth Soln)  0 ml EYEBOTH DAILY [facility]


   Last Admin: 20 12:22 Dose:  Not Given


Timolol Maleate (Timoptic 0.5% Ophth Soln)  0 ml EYEBOTH BEDTIME [facility]


   Last Admin: 20 21:34 Dose:  1 drop

## 2020-01-30 NOTE — PCM.PN
- General Info


Date of Service: 01/30/20


Admission Dx/Problem (Free Text): 


 Admission Diagnosis/Problem





Admission Diagnosis/Problem      Weakness








Subjective Update: 


 At about 10:53pm, I got called that patient had a fall when she went to use 

the bathroom. She fell on her face and hit her head.


She did not loose consciousness but broke her front teeth. She did not complain 

of body pains at that time. Gross exam by RN was unremarkable at that time. 


Upon, re-evaluation several minutes later, patient's condition appeared to be 

deteriorating. She was complaining of abdominal pain and had


become weaker. At 11:44 pm, I was called to come see patient as she had become 

clammy and was getting less responsive. BP was 106/11 with HR of 169. 


Upon my arrival, patient was minimally responsive, appeared pale, and cold. 

Immediate pulse check was negative. I ask and was told patient was full code. 

CPR was commenced immediately. IV access was obtained by intraosseous line. 

Normal saline was started wide open. Rhythm check looked like VFib and there 

was a faint pulse so patient was given 1 shock. She later went into PEA and 

received 1 mg epinephrine in each of 4 more cycles. 2 amps of bicarb was also 

given. 





Patient was pronounced dead at 12:20 am, 1/30/2020.


 





- Patient Data


Vitals - Most Recent: 


 Last Vital Signs











Temp  97.9 F   01/29/20 20:00


 


Pulse  115 H  01/29/20 20:00


 


Resp  16   01/29/20 20:00


 


BP  104/69   01/29/20 20:00


 


Pulse Ox  94 L  01/29/20 20:00











I&O - Last 24 Hours: 


 Intake & Output











 01/29/20 01/29/20 01/30/20





 14:59 22:59 06:59


 


Intake Total 40 510 


 


Balance 40 510 











Med Orders - Current: 


 Current Medications





Acetaminophen (Tylenol)  650 mg PO Q4H PRN


   PRN Reason: Pain (Mild 1-3)/fever


   Last Admin: 01/29/20 11:22 Dose:  650 mg


Acetaminophen (Tylenol Extra Strength)  1,000 mg PO Q8HR [facility]


   Last Admin: 01/29/20 21:04 Dose:  1,000 mg


Amlodipine Besylate (Norvasc)  5 mg PO DAILY [facility]


   Last Admin: 01/29/20 08:31 Dose:  5 mg


Aspirin (Ecotrin)  325 mg PO DAILY [facility]


   Last Admin: 01/29/20 08:31 Dose:  325 mg


Atorvastatin Calcium (Lipitor)  20 mg PO BEDTIME [facility]


   Last Admin: 01/29/20 20:56 Dose:  20 mg


Bisacodyl (Dulcolax)  10 mg RECTAL DAILY PRN


   PRN Reason: Constipation


   Last Admin: 01/27/20 12:24 Dose:  10 mg


Carbamide Perox/Anhydrous Glycerin (Debrox 6.5% Otic Soln)  1 ml EARRT BID [facility]


   Last Admin: 01/29/20 20:56 Dose:  1 ml


Docusate Sodium (Colace)  100 mg PO BID PRN


   PRN Reason: Constipation


   Last Admin: 01/26/20 21:31 Dose:  100 mg


Enoxaparin Sodium (Lovenox)  30 mg SUBCUT DAILY [facility]


   Last Admin: 01/29/20 08:31 Dose:  30 mg


Isosorbide Mononitrate (Imdur)  120 mg PO ACBRK [facility]


   Last Admin: 01/29/20 05:42 Dose:  120 mg


Latanoprost (Xalatan 0.005% Ophth Soln)  0 ml EYELF BEDTIME [facility]


   Last Admin: 01/29/20 21:02 Dose:  1 drop


Loratadine (Claritin)  10 mg PO DAILY [facility]


   Last Admin: 01/29/20 08:31 Dose:  10 mg


Lorazepam (Ativan)  0.5 mg PO TID PRN


   PRN Reason: Anxiety


Losartan Potassium (Cozaar)  100 mg PO DAILY [facility]


   Last Admin: 01/29/20 08:31 Dose:  100 mg


Fluorometholone 0.1% (Opht Susp)  1 drop EYELF DAILY [facility]


   Last Admin: 01/29/20 08:35 Dose:  1 drop


Brimonidine Tartrate [Alphagan P 0.1% Ophth Soln]  **Pt Own Med**  1 drop EYELF 

0900,1600 [facility]


   Last Admin: 01/29/20 17:04 Dose:  1 drop


Omeprazole (Omeprazole)  20 mg PO ACBREAKFAST [facility]


   Last Admin: 01/29/20 05:42 Dose:  20 mg


Ondansetron HCl (Zofran Odt)  4 mg PO Q6H PRN


   PRN Reason: nausea, able to take PO


Oxycodone HCl (Oxycodone)  5 mg PO Q6H PRN


   PRN Reason: Pain


Timolol Maleate (Timoptic 0.5% Ophth Soln)  0 ml EYEBOTH BEDTIME [facility]


   Last Admin: 01/29/20 20:58 Dose:  1 drop





Discontinued Medications





Latanoprost (Xalatan 0.005% Ophth Soln)  0 ml EYELF BEDTIME [facility]


   Last Admin: 01/25/20 21:37 Dose:  Not Given


Latanoprost (Xalatan 0.005% Ophth Soln)  0 ml EYELF BEDTIME [facility]


Brimonidine Tartrate [Alphagan P 0.1% Ophth Soln]  **Pt Own Med**  1 drop EYELF 

BID [facility]


   Last Admin: 01/26/20 09:09 Dose:  1 drop


Non-Formulary Medication (Omega-3/Dha/Epa/Fish Oil [Omega-3 Fish Oil 1,000 Mg 

Sfgl])  1 cap PO DAILY [facility]


Omeprazole (Omeprazole)  20 mg PO ACLUNCH [facility]


   Last Admin: 01/25/20 12:20 Dose:  20 mg


Timolol Maleate (Timoptic 0.5% Ophth Soln)  0 ml EYEBOTH DAILY [facility]


   Last Admin: 01/25/20 12:22 Dose:  Not Given


Timolol Maleate (Timoptic 0.5% Ophth Soln)  0 ml EYEBOTH BEDTIME [facility]


   Last Admin: 01/26/20 21:34 Dose:  1 drop











Sepsis Event Note





- Evaluation


Sepsis Screening Result: No Definite Risk





- Focused Exam


Vital Signs: 


 Vital Signs











  Temp Pulse Resp BP Pulse Ox


 


 01/29/20 20:00  97.9 F  115 H  16  104/69  94 L











Date Exam was Performed: 01/30/20


Time Exam was Performed: 11:20





- Problem List Review


Problem List Initiated/Reviewed/Updated: Yes





- My Orders


Last 24 Hours: 


My Active Orders





01/29/20 23:26


oxyCODONE   5 mg PO Q6H PRN 














- Plan


Plan::